# Patient Record
Sex: MALE | Race: WHITE | NOT HISPANIC OR LATINO | Employment: OTHER | URBAN - NONMETROPOLITAN AREA
[De-identification: names, ages, dates, MRNs, and addresses within clinical notes are randomized per-mention and may not be internally consistent; named-entity substitution may affect disease eponyms.]

---

## 2023-10-22 ENCOUNTER — APPOINTMENT (EMERGENCY)
Dept: CT IMAGING | Facility: HOSPITAL | Age: 63
End: 2023-10-22
Payer: COMMERCIAL

## 2023-10-22 ENCOUNTER — HOSPITAL ENCOUNTER (EMERGENCY)
Facility: HOSPITAL | Age: 63
Discharge: HOME | End: 2023-10-22
Attending: EMERGENCY MEDICINE
Payer: COMMERCIAL

## 2023-10-22 VITALS
DIASTOLIC BLOOD PRESSURE: 92 MMHG | TEMPERATURE: 97.2 F | SYSTOLIC BLOOD PRESSURE: 175 MMHG | RESPIRATION RATE: 16 BRPM | WEIGHT: 212.3 LBS | HEART RATE: 80 BPM | BODY MASS INDEX: 29.72 KG/M2 | OXYGEN SATURATION: 99 % | HEIGHT: 71 IN

## 2023-10-22 DIAGNOSIS — R55 SYNCOPE: Primary | ICD-10-CM

## 2023-10-22 LAB
2HR DELTA HS TROPONIN: -1 NG/L
ALBUMIN SERPL BCP-MCNC: 4.2 G/DL (ref 3.5–5)
ALP SERPL-CCNC: 83 U/L (ref 34–104)
ALT SERPL W P-5'-P-CCNC: 26 U/L (ref 7–52)
ANION GAP SERPL CALCULATED.3IONS-SCNC: 8 MMOL/L
APTT PPP: 27 SECONDS (ref 23–37)
AST SERPL W P-5'-P-CCNC: 19 U/L (ref 13–39)
BASOPHILS # BLD AUTO: 0.08 THOUSANDS/ÂΜL (ref 0–0.1)
BASOPHILS NFR BLD AUTO: 1 % (ref 0–1)
BILIRUB SERPL-MCNC: 0.51 MG/DL (ref 0.2–1)
BILIRUB UR QL STRIP: NEGATIVE
BUN SERPL-MCNC: 16 MG/DL (ref 5–25)
CALCIUM SERPL-MCNC: 9.4 MG/DL (ref 8.4–10.2)
CARDIAC TROPONIN I PNL SERPL HS: 3 NG/L
CARDIAC TROPONIN I PNL SERPL HS: 4 NG/L
CHLORIDE SERPL-SCNC: 105 MMOL/L (ref 96–108)
CLARITY UR: CLEAR
CO2 SERPL-SCNC: 24 MMOL/L (ref 21–32)
COLOR UR: YELLOW
CREAT SERPL-MCNC: 0.8 MG/DL (ref 0.6–1.3)
D DIMER PPP FEU-MCNC: 0.41 UG/ML FEU
EOSINOPHIL # BLD AUTO: 0.23 THOUSAND/ÂΜL (ref 0–0.61)
EOSINOPHIL NFR BLD AUTO: 3 % (ref 0–6)
ERYTHROCYTE [DISTWIDTH] IN BLOOD BY AUTOMATED COUNT: 14.5 % (ref 11.6–15.1)
GFR SERPL CREATININE-BSD FRML MDRD: 94 ML/MIN/1.73SQ M
GLUCOSE SERPL-MCNC: 111 MG/DL (ref 65–140)
GLUCOSE SERPL-MCNC: 122 MG/DL (ref 65–140)
GLUCOSE UR STRIP-MCNC: NEGATIVE MG/DL
HCT VFR BLD AUTO: 48.2 % (ref 36.5–49.3)
HGB BLD-MCNC: 16.2 G/DL (ref 12–17)
HGB UR QL STRIP.AUTO: NEGATIVE
IMM GRANULOCYTES # BLD AUTO: 0.05 THOUSAND/UL (ref 0–0.2)
IMM GRANULOCYTES NFR BLD AUTO: 1 % (ref 0–2)
INR PPP: 0.92 (ref 0.84–1.19)
KETONES UR STRIP-MCNC: NEGATIVE MG/DL
LEUKOCYTE ESTERASE UR QL STRIP: NEGATIVE
LYMPHOCYTES # BLD AUTO: 1.78 THOUSANDS/ÂΜL (ref 0.6–4.47)
LYMPHOCYTES NFR BLD AUTO: 23 % (ref 14–44)
MCH RBC QN AUTO: 26.1 PG (ref 26.8–34.3)
MCHC RBC AUTO-ENTMCNC: 33.6 G/DL (ref 31.4–37.4)
MCV RBC AUTO: 78 FL (ref 82–98)
MONOCYTES # BLD AUTO: 0.52 THOUSAND/ÂΜL (ref 0.17–1.22)
MONOCYTES NFR BLD AUTO: 7 % (ref 4–12)
NEUTROPHILS # BLD AUTO: 5 THOUSANDS/ÂΜL (ref 1.85–7.62)
NEUTS SEG NFR BLD AUTO: 65 % (ref 43–75)
NITRITE UR QL STRIP: NEGATIVE
NRBC BLD AUTO-RTO: 0 /100 WBCS
PH UR STRIP.AUTO: 6.5 [PH]
PLATELET # BLD AUTO: 288 THOUSANDS/UL (ref 149–390)
PMV BLD AUTO: 9.1 FL (ref 8.9–12.7)
POTASSIUM SERPL-SCNC: 4 MMOL/L (ref 3.5–5.3)
PROT SERPL-MCNC: 7.5 G/DL (ref 6.4–8.4)
PROT UR STRIP-MCNC: NEGATIVE MG/DL
PROTHROMBIN TIME: 12.6 SECONDS (ref 11.6–14.5)
RBC # BLD AUTO: 6.2 MILLION/UL (ref 3.88–5.62)
SODIUM SERPL-SCNC: 137 MMOL/L (ref 135–147)
SP GR UR STRIP.AUTO: 1.02 (ref 1–1.03)
TSH SERPL DL<=0.05 MIU/L-ACNC: 3.24 UIU/ML (ref 0.45–4.5)
UROBILINOGEN UR QL STRIP.AUTO: 0.2 E.U./DL
WBC # BLD AUTO: 7.66 THOUSAND/UL (ref 4.31–10.16)

## 2023-10-22 PROCEDURE — G1004 CDSM NDSC: HCPCS

## 2023-10-22 PROCEDURE — 85730 THROMBOPLASTIN TIME PARTIAL: CPT | Performed by: EMERGENCY MEDICINE

## 2023-10-22 PROCEDURE — 93005 ELECTROCARDIOGRAM TRACING: CPT

## 2023-10-22 PROCEDURE — 36415 COLL VENOUS BLD VENIPUNCTURE: CPT | Performed by: EMERGENCY MEDICINE

## 2023-10-22 PROCEDURE — 84484 ASSAY OF TROPONIN QUANT: CPT | Performed by: EMERGENCY MEDICINE

## 2023-10-22 PROCEDURE — 82948 REAGENT STRIP/BLOOD GLUCOSE: CPT

## 2023-10-22 PROCEDURE — 70450 CT HEAD/BRAIN W/O DYE: CPT

## 2023-10-22 PROCEDURE — 80053 COMPREHEN METABOLIC PANEL: CPT | Performed by: EMERGENCY MEDICINE

## 2023-10-22 PROCEDURE — 96361 HYDRATE IV INFUSION ADD-ON: CPT

## 2023-10-22 PROCEDURE — 99285 EMERGENCY DEPT VISIT HI MDM: CPT | Performed by: EMERGENCY MEDICINE

## 2023-10-22 PROCEDURE — 85379 FIBRIN DEGRADATION QUANT: CPT | Performed by: EMERGENCY MEDICINE

## 2023-10-22 PROCEDURE — 85610 PROTHROMBIN TIME: CPT | Performed by: EMERGENCY MEDICINE

## 2023-10-22 PROCEDURE — 81003 URINALYSIS AUTO W/O SCOPE: CPT | Performed by: EMERGENCY MEDICINE

## 2023-10-22 PROCEDURE — 96374 THER/PROPH/DIAG INJ IV PUSH: CPT

## 2023-10-22 PROCEDURE — 99284 EMERGENCY DEPT VISIT MOD MDM: CPT

## 2023-10-22 PROCEDURE — 85025 COMPLETE CBC W/AUTO DIFF WBC: CPT | Performed by: EMERGENCY MEDICINE

## 2023-10-22 PROCEDURE — 84443 ASSAY THYROID STIM HORMONE: CPT | Performed by: EMERGENCY MEDICINE

## 2023-10-22 RX ORDER — HYDRALAZINE HYDROCHLORIDE 20 MG/ML
10 INJECTION INTRAMUSCULAR; INTRAVENOUS ONCE
Status: COMPLETED | OUTPATIENT
Start: 2023-10-22 | End: 2023-10-22

## 2023-10-22 RX ORDER — LOSARTAN POTASSIUM 100 MG/1
25 TABLET ORAL DAILY
COMMUNITY

## 2023-10-22 RX ADMIN — HYDRALAZINE HYDROCHLORIDE 10 MG: 20 INJECTION INTRAMUSCULAR; INTRAVENOUS at 13:46

## 2023-10-22 RX ADMIN — SODIUM CHLORIDE 1000 ML: 0.9 INJECTION, SOLUTION INTRAVENOUS at 13:25

## 2023-10-22 NOTE — ED PROVIDER NOTES
History  Chief Complaint   Patient presents with    Syncope     Passed out approximately 1 hour ago unwitnessed. Has been feeling lightheaded for the past couple days. Patient just got home from [employer] today when he was cooking a meal.  Suddenly got nauseated and lightheaded. Got dizzy. Brief loss of consciousness. No chest pain. No palpitations. Had a similar episode 2 days ago while golfing. That time he got diaphoretic. Again no chest pain or palpitations. No abdominal pain or back pain. Feels better now. Wife found him slumped over. No seizure-like activity. No loss of bladder or bowel functions. Blood pressure medications were changed in September and increased. Nothing within the last week or so. History provided by:  Patient   used: No    Syncope  Episode history:  Single  Most recent episode: Today  Duration: A few seconds. Timing:  Constant  Progression:  Resolved  Chronicity:  New  Context: not standing up    Witnessed: no    Relieved by:  Nothing  Worsened by:  Nothing  Ineffective treatments:  None tried  Associated symptoms: diaphoresis, dizziness and nausea    Associated symptoms: no anxiety, no chest pain, no fever, no headaches, no palpitations, no seizures, no shortness of breath, no vomiting and no weakness        Prior to Admission Medications   Prescriptions Last Dose Informant Patient Reported? Taking?   losartan (COZAAR) 100 MG tablet 10/22/2023  Yes Yes   Sig: Take 25 mg by mouth daily      Facility-Administered Medications: None       Past Medical History:   Diagnosis Date    Hypertension        Past Surgical History:   Procedure Laterality Date    KNEE SURGERY Left        History reviewed. No pertinent family history. I have reviewed and agree with the history as documented.     E-Cigarette/Vaping    E-Cigarette Use Never User      E-Cigarette/Vaping Substances     Social History     Tobacco Use    Smoking status: Never    Smokeless tobacco: Never   Vaping Use    Vaping Use: Never used   Substance Use Topics    Alcohol use: Not Currently     Comment: socially    Drug use: Never       Review of Systems   Constitutional:  Positive for diaphoresis. Negative for chills and fever. HENT:  Negative for ear pain, hearing loss, sore throat, trouble swallowing and voice change. Eyes:  Negative for pain and discharge. Respiratory:  Negative for cough, shortness of breath and wheezing. Cardiovascular:  Positive for syncope. Negative for chest pain and palpitations. Gastrointestinal:  Positive for nausea. Negative for abdominal pain, blood in stool, constipation, diarrhea and vomiting. Genitourinary:  Negative for dysuria, flank pain, frequency and hematuria. Musculoskeletal:  Negative for joint swelling, neck pain and neck stiffness. Skin:  Negative for rash and wound. Neurological:  Positive for dizziness. Negative for seizures, syncope, facial asymmetry, weakness and headaches. Psychiatric/Behavioral:  Negative for hallucinations, self-injury and suicidal ideas. All other systems reviewed and are negative. Physical Exam  Physical Exam  Vitals and nursing note reviewed. Constitutional:       General: He is not in acute distress. Appearance: He is well-developed. HENT:      Head: Normocephalic and atraumatic. Right Ear: External ear normal.      Left Ear: External ear normal.   Eyes:      General: No scleral icterus. Right eye: No discharge. Left eye: No discharge. Extraocular Movements: Extraocular movements intact. Conjunctiva/sclera: Conjunctivae normal.   Cardiovascular:      Rate and Rhythm: Normal rate and regular rhythm. Heart sounds: Normal heart sounds. No murmur heard. Pulmonary:      Effort: Pulmonary effort is normal.      Breath sounds: Normal breath sounds. No wheezing or rales. Abdominal:      General: Bowel sounds are normal. There is no distension.       Palpations: Abdomen is soft. Tenderness: There is no abdominal tenderness. There is no guarding or rebound. Musculoskeletal:         General: No deformity. Normal range of motion. Cervical back: Normal range of motion and neck supple. Skin:     General: Skin is warm and dry. Findings: No rash. Neurological:      General: No focal deficit present. Mental Status: He is alert and oriented to person, place, and time. Cranial Nerves: No cranial nerve deficit. Psychiatric:         Mood and Affect: Mood normal.         Behavior: Behavior normal.         Thought Content:  Thought content normal.         Judgment: Judgment normal.         Vital Signs  ED Triage Vitals   Temperature Pulse Respirations Blood Pressure SpO2   10/22/23 1256 10/22/23 1250 10/22/23 1250 10/22/23 1250 10/22/23 1250   (!) 97.2 °F (36.2 °C) 75 18 (!) 211/118 98 %      Temp Source Heart Rate Source Patient Position - Orthostatic VS BP Location FiO2 (%)   10/22/23 1256 10/22/23 1250 10/22/23 1250 10/22/23 1250 --   Temporal Monitor Lying Left arm       Pain Score       10/22/23 1250       No Pain           Vitals:    10/22/23 1250 10/22/23 1315 10/22/23 1400   BP: (!) 211/118 (!) 176/97 164/91   Pulse: 75 71 77   Patient Position - Orthostatic VS: Lying           Visual Acuity      ED Medications  Medications   sodium chloride 0.9 % bolus 1,000 mL (1,000 mL Intravenous New Bag 10/22/23 1325)   hydrALAZINE (APRESOLINE) injection 10 mg (10 mg Intravenous Given 10/22/23 1346)       Diagnostic Studies  Results Reviewed       Procedure Component Value Units Date/Time    HS Troponin I 2hr [[id number]]  (Normal) Collected: 10/22/23 1435    Lab Status: Final result Specimen: Blood from Arm, Right Updated: 10/22/23 1504     hs TnI 2hr 3 ng/L      Delta 2hr hsTnI -1 ng/L     HS Troponin I 4hr [[id number]]     Lab Status: No result Specimen: Blood     UA w Reflex to Microscopic w Reflex to Culture [[id number]] Collected: 10/22/23 1435    Lab Status: Final result Specimen: Urine, Clean Catch Updated: 10/22/23 1444     Color, UA Yellow     Clarity, UA Clear     Specific Gravity, UA 1.020     pH, UA 6.5     Leukocytes, UA Negative     Nitrite, UA Negative     Protein, UA Negative mg/dl      Glucose, UA Negative mg/dl      Ketones, UA Negative mg/dl      Urobilinogen, UA 0.2 E.U./dl      Bilirubin, UA Negative     Occult Blood, UA Negative    TSH, 3rd generation with Free T4 reflex [[id number]]  (Normal) Collected: 10/22/23 1259    Lab Status: Final result Specimen: Blood Updated: 10/22/23 1420     TSH 3RD GENERATON 3.241 uIU/mL     CBC and differential [[id number]]  (Abnormal) Collected: 10/22/23 1310    Lab Status: Final result Specimen: Blood Updated: 10/22/23 1336     WBC 7.66 Thousand/uL      RBC 6.20 Million/uL      Hemoglobin 16.2 g/dL      Hematocrit 48.2 %      MCV 78 fL      MCH 26.1 pg      MCHC 33.6 g/dL      RDW 14.5 %      MPV 9.1 fL      Platelets 589 Thousands/uL      nRBC 0 /100 WBCs      Neutrophils Relative 65 %      Immat GRANS % 1 %      Lymphocytes Relative 23 %      Monocytes Relative 7 %      Eosinophils Relative 3 %      Basophils Relative 1 %      Neutrophils Absolute 5.00 Thousands/µL      Immature Grans Absolute 0.05 Thousand/uL      Lymphocytes Absolute 1.78 Thousands/µL      Monocytes Absolute 0.52 Thousand/µL      Eosinophils Absolute 0.23 Thousand/µL      Basophils Absolute 0.08 Thousands/µL     Narrative: This is an appended report. These results have been appended to a previously verified report. D-Dimer [[id number]]  (Normal) Resulted: 10/22/23 1333    Lab Status: Final result Specimen: Blood Updated: 10/22/23 1333     D-Dimer, Quant 0.41 ug/ml FEU     Narrative:       In the evaluation for possible pulmonary embolism, in the appropriate (Well's Score of 4 or less) patient, the age adjusted d-dimer cutoff for this patient can be calculated as:    Age x 0.01 (in ug/mL) for Age-adjusted D-dimer exclusion threshold for a patient over 50 years. APTT [[id number]]  (Normal) Resulted: 10/22/23 1332    Lab Status: Final result Specimen: Blood Updated: 10/22/23 1332     PTT 27 seconds     HS Troponin 0hr (reflex protocol) [[id number]]  (Normal) Resulted: 10/22/23 1332    Lab Status: Final result Specimen: Blood Updated: 10/22/23 1332     hs TnI 0hr 4 ng/L     Protime-INR [[id number]]  (Normal) Resulted: 10/22/23 1332    Lab Status: Final result Specimen: Blood Updated: 10/22/23 1332     Protime 12.6 seconds      INR 0.92    Comprehensive metabolic panel [[id number]] Collected: 10/22/23 1259    Lab Status: Final result Specimen: Blood Updated: 10/22/23 1331     Sodium 137 mmol/L      Potassium 4.0 mmol/L      Chloride 105 mmol/L      CO2 24 mmol/L      ANION GAP 8 mmol/L      BUN 16 mg/dL      Creatinine 0.80 mg/dL      Glucose 122 mg/dL      Calcium 9.4 mg/dL      AST 19 U/L      ALT 26 U/L      Alkaline Phosphatase 83 U/L      Total Protein 7.5 g/dL      Albumin 4.2 g/dL      Total Bilirubin 0.51 mg/dL      eGFR 94 ml/min/1.73sq m     Narrative:      [place] guidelines for Chronic Kidney Disease (CKD):     Stage 1 with normal or high GFR (GFR > 90 mL/min/1.73 square meters)    Stage 2 Mild CKD (GFR = 60-89 mL/min/1.73 square meters)    Stage 3A Moderate CKD (GFR = 45-59 mL/min/1.73 square meters)    Stage 3B Moderate CKD (GFR = 30-44 mL/min/1.73 square meters)    Stage 4 Severe CKD (GFR = 15-29 mL/min/1.73 square meters)    Stage 5 End Stage CKD (GFR <15 mL/min/1.73 square meters)  Note: GFR calculation is accurate only with a steady state creatinine    Fingerstick Glucose (POCT) [[id number]]  (Normal) Collected: 10/22/23 1253    Lab Status: Final result Updated: 10/22/23 1254     POC Glucose 111 mg/dl                    CT head without contrast   Final Result by Phil Traore MD (10/22 1350)      No acute intracranial abnormality. Chronic microangiopathic changes.                   Workstation performed: [id number] Procedures  ECG 12 Lead Documentation Only    Date/Time: 10/22/2023 12:53 PM    Performed by: Maricruz Marie MD  Authorized by: Maricruz Marie MD    ECG reviewed by me, the ED Provider: yes    Patient location:  ED  Previous ECG:     Previous ECG:  Unavailable  Rate:     ECG rate:  70  Rhythm:     Rhythm: sinus rhythm    Ectopy:     Ectopy: none    QRS:     QRS axis:  Normal  T waves:     T waves: inverted      Inverted:  III           ED Course  ED Course as of 10/22/23 1506   Sun Oct 22, 2023   1335 D-Dimer, Quant: 0.41   1335 hs TnI 0hr: 4   1505 Discussed with patient and wife about his lab and CAT scan results. Head CT normal.  Patient was cautioned about possibly holding off driving until he is seen by cardiology. Referral to cardiology has been made. Medical Decision Making  Amount and/or Complexity of Data Reviewed  Labs: ordered. Decision-making details documented in ED Course. Radiology: ordered. Decision-making details documented in ED Course. ECG/medicine tests: ordered and independent interpretation performed. Decision-making details documented in ED Course. Discussion of management or test interpretation with external provider(s): Differential diagnosis includes but not limited to STEMI, NSTEMI, PE, pneumonia, pneumothorax, musculoskeletal chest pain, costochondritis, gastritis, cholelithiasis, contusion, strain    Risk  Prescription drug management. Disposition  Final diagnoses:   Syncope     Time reflects when diagnosis was documented in both MDM as applicable and the Disposition within this note       Time User Action Codes Description Comment    10/22/2023  2:56 PM Maricruz Marie Add [R55] Syncope           ED Disposition       ED Disposition   Discharge    Condition   Stable    Date/Time   Sun Oct 22, 2023  2:57 PM    Comment   Des Quinn discharge to home/self care.                    Follow-up Information Follow up With Specialties Details Why Contact Info    Justin Guerrero DO Family Medicine Call in 1 day  [address] [facility] [phone] [facility] Call in 1 day  [address]  [phone]              Patient's Medications   Discharge Prescriptions    No medications on file           PDMP Review       None            ED Provider  Electronically Signed by             Fifi Mosher MD  10/22/23 4208

## 2023-10-23 LAB
ATRIAL RATE: 74 BPM
P AXIS: 29 DEGREES
PR INTERVAL: 176 MS
QRS AXIS: -31 DEGREES
QRSD INTERVAL: 92 MS
QT INTERVAL: 390 MS
QTC INTERVAL: 432 MS
T WAVE AXIS: -5 DEGREES
VENTRICULAR RATE: 74 BPM

## 2023-11-13 ENCOUNTER — HOSPITAL ENCOUNTER (OUTPATIENT)
Dept: NON INVASIVE DIAGNOSTICS | Facility: HOSPITAL | Age: 63
Discharge: HOME | End: 2023-11-13
Attending: INTERNAL MEDICINE
Payer: COMMERCIAL

## 2023-11-13 VITALS
DIASTOLIC BLOOD PRESSURE: 92 MMHG | SYSTOLIC BLOOD PRESSURE: 175 MMHG | HEIGHT: 70 IN | BODY MASS INDEX: 30.06 KG/M2 | WEIGHT: 210 LBS

## 2023-11-13 DIAGNOSIS — I10 HTN (HYPERTENSION), BENIGN: ICD-10-CM

## 2023-11-13 LAB
AORTIC ROOT: 3.9 CM
AORTIC VALVE MEAN VELOCITY: 9.9 M/S
APICAL FOUR CHAMBER EJECTION FRACTION: 63 %
ASCENDING AORTA: 3.5 CM
AV MEAN GRADIENT: 4 MMHG
AV PEAK GRADIENT: 9 MMHG
DOP CALC AO PEAK VEL: 1.47 M/S
DOP CALC AO VTI: 28.72 CM
DOP CALC LVOT AREA: 4.15 CM2
DOP CALC LVOT DIAMETER: 2.3 CM
E WAVE DECELERATION TIME: 151 MS
E/A RATIO: 0.67
FRACTIONAL SHORTENING: 18 (ref 28–44)
INTERVENTRICULAR SEPTUM IN DIASTOLE (PARASTERNAL SHORT AXIS VIEW): 1.2 CM
INTERVENTRICULAR SEPTUM: 1.2 CM (ref 0.6–1.1)
LAAS-AP2: 21.8 CM2
LAAS-AP4: 26.1 CM2
LEFT ATRIUM SIZE: 4.1 CM
LEFT ATRIUM VOLUME (MOD BIPLANE): 76 ML
LEFT ATRIUM VOLUME INDEX (MOD BIPLANE): 35.2 ML/M2
LEFT INTERNAL DIMENSION IN SYSTOLE: 3.7 CM (ref 2.1–4)
LEFT VENTRICLE DIASTOLIC VOLUME (MOD BIPLANE): 111 ML
LEFT VENTRICLE SYSTOLIC VOLUME (MOD BIPLANE): 41 ML
LEFT VENTRICULAR INTERNAL DIMENSION IN DIASTOLE: 4.5 CM (ref 3.5–6)
LEFT VENTRICULAR POSTERIOR WALL IN END DIASTOLE: 1.3 CM
LEFT VENTRICULAR STROKE VOLUME: 36 ML
LV EF: 63 %
LVSV (TEICH): 36 ML
MV E'TISSUE VEL-LAT: 9 CM/S
MV E'TISSUE VEL-SEP: 7 CM/S
MV PEAK A VEL: 0.73 M/S
MV PEAK E VEL: 49 CM/S
MV STENOSIS PRESSURE HALF TIME: 44 MS
MV VALVE AREA P 1/2 METHOD: 5
PV PEAK GRADIENT: 5 MMHG
RIGHT ATRIUM AREA SYSTOLE A4C: 23.2 CM2
RIGHT VENTRICLE ID DIMENSION: 3.6 CM
SL CV LEFT ATRIUM LENGTH A2C: 6.1 CM
SL CV LV EF: 63
SL CV PED ECHO LEFT VENTRICLE DIASTOLIC VOLUME (MOD BIPLANE) 2D: 93 ML
SL CV PED ECHO LEFT VENTRICLE SYSTOLIC VOLUME (MOD BIPLANE) 2D: 56 ML
TR MAX PG: 22 MMHG
TR PEAK VELOCITY: 2.3 M/S
TRICUSPID ANNULAR PLANE SYSTOLIC EXCURSION: 2.1 CM
TRICUSPID VALVE PEAK REGURGITATION VELOCITY: 2.34 M/S

## 2023-11-13 PROCEDURE — 93306 TTE W/DOPPLER COMPLETE: CPT

## 2023-11-14 DIAGNOSIS — I71.21 ANEURYSM OF THE ASCENDING AORTA, WITHOUT RUPTURE (HCC): ICD-10-CM

## 2023-11-20 ENCOUNTER — APPOINTMENT (OUTPATIENT)
Dept: LAB | Facility: HOSPITAL | Age: 63
End: 2023-11-20
Payer: COMMERCIAL

## 2023-11-20 DIAGNOSIS — I71.21 ANEURYSM OF ASCENDING AORTA WITHOUT RUPTURE (HCC): ICD-10-CM

## 2023-11-20 LAB
CREAT SERPL-MCNC: 0.72 MG/DL (ref 0.6–1.3)
GFR SERPL CREATININE-BSD FRML MDRD: 98 ML/MIN/1.73SQ M

## 2023-11-20 PROCEDURE — 82565 ASSAY OF CREATININE: CPT

## 2023-11-20 PROCEDURE — 36415 COLL VENOUS BLD VENIPUNCTURE: CPT

## 2023-11-29 ENCOUNTER — HOSPITAL ENCOUNTER (OUTPATIENT)
Dept: CT IMAGING | Facility: HOSPITAL | Age: 63
Discharge: HOME | End: 2023-11-29
Attending: INTERNAL MEDICINE
Payer: COMMERCIAL

## 2023-11-29 DIAGNOSIS — I71.21 ANEURYSM OF THE ASCENDING AORTA, WITHOUT RUPTURE (HCC): ICD-10-CM

## 2023-11-29 PROCEDURE — 71275 CT ANGIOGRAPHY CHEST: CPT

## 2023-11-29 RX ADMIN — IOHEXOL 100 ML: 350 INJECTION, SOLUTION INTRAVENOUS at 09:04
